# Patient Record
Sex: MALE | Race: BLACK OR AFRICAN AMERICAN | NOT HISPANIC OR LATINO | Employment: OTHER | URBAN - METROPOLITAN AREA
[De-identification: names, ages, dates, MRNs, and addresses within clinical notes are randomized per-mention and may not be internally consistent; named-entity substitution may affect disease eponyms.]

---

## 2020-11-19 ENCOUNTER — TELEPHONE (OUTPATIENT)
Dept: GASTROENTEROLOGY | Facility: CLINIC | Age: 70
End: 2020-11-19

## 2020-11-19 NOTE — TELEPHONE ENCOUNTER
Called patient on number listed and it is the E.R department. Lady that answered did not know anything of patient .

## 2020-11-19 NOTE — TELEPHONE ENCOUNTER
----- Message from Yolanda Hubbard sent at 11/19/2020 11:38 AM CST -----  Contact: Pt  Pt would like a call back at .[phone] (home), he wants an appt in gastro with Dr Navarrete.    Thanks     Yolanda Hubbard

## 2020-11-20 ENCOUNTER — TELEPHONE (OUTPATIENT)
Dept: ENDOSCOPY | Facility: HOSPITAL | Age: 70
End: 2020-11-20

## 2020-11-20 NOTE — TELEPHONE ENCOUNTER
Spoke with Romina regarding a colonoscopy date/time.  Informed her of no order and no referral being noted in the system at this time.  States she would refax the information.

## 2020-11-20 NOTE — TELEPHONE ENCOUNTER
----- Message from Bree Joe sent at 11/20/2020  9:55 AM CST -----  Regarding: [facility] for status of colonoscopy appt ... call back: [phone]

## 2020-12-03 ENCOUNTER — OFFICE VISIT (OUTPATIENT)
Dept: GASTROENTEROLOGY | Facility: CLINIC | Age: 70
End: 2020-12-03
Payer: OTHER GOVERNMENT

## 2020-12-03 VITALS
HEART RATE: 76 BPM | OXYGEN SATURATION: 97 % | BODY MASS INDEX: 28.01 KG/M2 | HEIGHT: 75 IN | DIASTOLIC BLOOD PRESSURE: 72 MMHG | WEIGHT: 225.31 LBS | SYSTOLIC BLOOD PRESSURE: 104 MMHG

## 2020-12-03 DIAGNOSIS — Z12.11 COLON CANCER SCREENING: Primary | ICD-10-CM

## 2020-12-03 PROCEDURE — 99999 PR PBB SHADOW E&M-EST. PATIENT-LVL IV: ICD-10-PCS | Mod: PBBFAC,,, | Performed by: PHYSICIAN ASSISTANT

## 2020-12-03 PROCEDURE — 99499 NO LOS: ICD-10-PCS | Mod: S$PBB,,, | Performed by: PHYSICIAN ASSISTANT

## 2020-12-03 PROCEDURE — 99999 PR PBB SHADOW E&M-EST. PATIENT-LVL IV: CPT | Mod: PBBFAC,,, | Performed by: PHYSICIAN ASSISTANT

## 2020-12-03 PROCEDURE — 99499 UNLISTED E&M SERVICE: CPT | Mod: S$PBB,,, | Performed by: PHYSICIAN ASSISTANT

## 2020-12-03 PROCEDURE — 99214 OFFICE O/P EST MOD 30 MIN: CPT | Mod: PBBFAC | Performed by: PHYSICIAN ASSISTANT

## 2020-12-03 RX ORDER — BUPROPION HYDROCHLORIDE 100 MG/1
100 TABLET ORAL
COMMUNITY

## 2020-12-03 RX ORDER — NAPROXEN SODIUM 220 MG
220 TABLET ORAL
COMMUNITY

## 2020-12-03 RX ORDER — ACETAMINOPHEN 325 MG/1
325 TABLET ORAL
COMMUNITY

## 2020-12-03 RX ORDER — POLYETHYLENE GLYCOL 3350, SODIUM CHLORIDE, SODIUM BICARBONATE, POTASSIUM CHLORIDE 420; 11.2; 5.72; 1.48 G/4L; G/4L; G/4L; G/4L
1 POWDER, FOR SOLUTION ORAL ONCE
Qty: 4000 ML | Refills: 0 | Status: SHIPPED | OUTPATIENT
Start: 2020-12-03 | End: 2020-12-03

## 2020-12-03 RX ORDER — FERROUS GLUCONATE 324(38)MG
324 TABLET ORAL
COMMUNITY

## 2020-12-03 RX ORDER — TIOTROPIUM BROMIDE 18 UG/1
18 CAPSULE ORAL; RESPIRATORY (INHALATION)
COMMUNITY

## 2020-12-03 RX ORDER — BUDESONIDE AND FORMOTEROL FUMARATE DIHYDRATE 160; 4.5 UG/1; UG/1
2 AEROSOL RESPIRATORY (INHALATION)
COMMUNITY

## 2020-12-03 RX ORDER — PROPRANOLOL HYDROCHLORIDE 10 MG/1
10 TABLET ORAL
COMMUNITY

## 2020-12-03 NOTE — PROGRESS NOTES
"  1. Have you been admitted overnight to the hospital in the past 3 months? no   If yes, schedule an appointment with PCP before scheduling endoscopic procedure.     2. Have you had a stent placed in the last 12 months? no   If yes, for a screening visit, cancel and message the ordering provider.  The patient will need a new order when the time is appropriate.     3. Have you had a stroke or heart attack in the past 6 months? no   If yes, cancel and refer patient to ordering provider for clearance, also message ordering provider to inform.     4. Have you had any chest pain in the past 3 months? no   If yes, Have you been evaluated by your PCP and/or cardiologist and it was determined to not be heart related? not applicable   If No, Pt needs to be seen by PCP or Cardiologist .  Pt can be scheduled once clearance obtained by either of those providers.     5. Do you take prescription weight loss medications?  no   If yes, must stop for 2 weeks prior to procedure.     6. Have you been diagnosed with diverticulitis within the past 3 months? no   If yes, must have been seen by GI within the last 3 months, if not schedule with GI FERNANDO.    If pt has been seen by GI, schedule procedure 8-12 weeks post antibiotic treatment.     7. Are you on Dialysis? no  If yes, schedule procedure for the day AFTER dialysis.  Appt time should be 9am or later, patient arrival time is 2 hours prior.  Nulytely or miralax prep for all patients with kidney disease.     8. Are you diabetic?  no   If yes, schedule morning appt. Advise pt to hold all diabetic meds day of procedure.     9. If pt is older than 80 years of age and HAS NOT been seen by GI or PCP within the last 6 months, needs appt with GI FERNANDO.   If pt has been seen by the GI provider or PCP within the past 6  months AND meets criteria, schedule procedure AND send message to the endoscopist.     10. Is patient on a "high risk" medication (blood thinner/antiplatelet agent)?  no   If yes, " has cardiac clearance been obtained within the last 60 days? N/A   If no, a new clearance needs to be obtained.       I have reviewed the last colonoscopy for recommendations regarding next procedure bowel prep.  yes  I have reviewed medications and allergies.  yes  I have verified the pharmacy information and appropriate prep sent if needed. yes  Prep instructions have been mailed or sent to portal per patient request. yes    If answers yes to any of the following, schedule at [facility] ONLY. If No, OK for either location.     Is BMI over 45? no  Any complaints of chest pain, new onset or at rest? no  Does pt have an AICD? no  Is there a diagnosis of heart failure? no  Is patient on dialysis? no  Does patient have an insulin pump? no  If procedure for esophageal banding? no

## 2020-12-03 NOTE — LETTER
December 3, 2020      [facility]  [address] [address]           [facility]  [id number] [place] [id number]  Phone: [phone]  Fax: [phone]          Patient: Joao Cleveland Jr.   MR Number: [id number]   YOB: 1950   Date of Visit: 12/3/2020       Dear [facility]:    Thank you for referring Joao Cleveland to me for evaluation. Attached you will find relevant portions of my assessment and plan of care.    If you have questions, please do not hesitate to call me. I look forward to following Joao Cleveland along with you.    Sincerely,    Katelynn Aguirre PA-C    Enclosure  CC:  No Recipients    If you would like to receive this communication electronically, please contact [email or web address] or [phone] to request more information on [facility] Link access.    For providers and/or their staff who would like to refer a patient to [facility], please contact us through our one-stop-shop provider referral line, [facility], at [phone].    If you feel you have received this communication in error or would no longer like to receive these types of communications, please e-mail [email or web address]

## 2020-12-03 NOTE — PROGRESS NOTES
"Clinic Consult:  [facility] Consultation Note    Reason for Consult:  The encounter diagnosis was Colon cancer screening.    PCP: [facility])   [address] [facility] [place] [address]    CC: Colonoscopy      HPI:  This is a 70 y.o. male with history of cirrhosis here for evaluation of the above. [facility]. Patient states [place] [facility] [place] Believes he had polyps. Also mentions he had a colostomy bag approximately 10 years ago, but can't recall the reason for his surgery. States that he knows "they removed something". [facility] chart shows COPD and asthma on medical history, although patient states he does not have COPD, only asthma. Takes Spiriva daily, prescribed by [facility]. Does not see a pulmonologist. No problem list in [facility] record to confirm or deny this. Occasional shortness of breath if he walks to far. O2 at 97% today in clinic.  Denies abdominal pain, diarrhea, blood in the stool, black stools, nausea, vomiting. Confirms constipation. Takes magnesium citrate as needed for this. No additional complaints. No blood thinners.       Review of Systems   Constitutional: Negative for activity change, appetite change, chills, diaphoresis, fatigue, fever and unexpected weight change.   HENT: Negative for trouble swallowing.    Respiratory: Positive for shortness of breath (Occasional). Negative for cough.    Cardiovascular: Negative for chest pain.   Gastrointestinal: Positive for constipation. Negative for abdominal distention, abdominal pain, anal bleeding, blood in stool, diarrhea, nausea and vomiting.   Neurological: Negative for dizziness, weakness and light-headedness.   Psychiatric/Behavioral: Negative for dysphoric mood. The patient is not nervous/anxious.         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         Family History:    Family History   Problem Relation Age of Onset    Asthma Father     Diabetes Neg Hx        Social History:   Social History     Socioeconomic History    Marital status: Single     Spouse name: Not on file    Number of children: Not on file    Years of education: Not on file    Highest education level: Not on file   Occupational History    Not on file   Social Needs    Financial resource strain: Not on file    Food insecurity     Worry: Not on file     Inability: Not on file    Transportation needs     Medical: Not on file     Non-medical: Not on file   Tobacco Use    Smoking status: Never Smoker    Smokeless tobacco: Never Used   Substance and Sexual Activity    Alcohol use: Not Currently    Drug use: Yes     Types: Cocaine    Sexual activity: Not Currently   Lifestyle    Physical activity     Days per week: Not on file     Minutes per session: Not on file    Stress: Not on file   Relationships    Social connections     Talks on phone: Not on file     Gets together: Not on file     Attends Congregation service: Not on file     Active member of club or organization: Not on file     Attends meetings of clubs or organizations: Not on file     Relationship status: Not on file   Other Topics Concern    Not on file   Social History Narrative    Not on file       Allergies:   Review of patient's allergies indicates:   Allergen Reactions    Amoxicillin        Home Medications:   Current Outpatient Medications on File Prior to Visit   Medication Sig Dispense Refill    acetaminophen (TYLENOL) 325 MG tablet Take 325 mg by mouth as needed for Pain.      budesonide-formoterol 160-4.5 mcg (SYMBICORT) 160-4.5 mcg/actuation HFAA Inhale 2 puffs into the lungs.      buPROPion (WELLBUTRIN) 100 MG tablet Take 100 mg by  "mouth.      ferrous gluconate (FERGON) 324 MG tablet Take 324 mg by mouth.      naproxen sodium (ANAPROX) 220 MG tablet Take 220 mg by mouth as needed.      propranoloL (INDERAL) 10 MG tablet Take 10 mg by mouth.      tiotropium (SPIRIVA) 18 mcg inhalation capsule Inhale 18 mcg into the lungs.      nystatin (MYCOSTATIN) powder Apply topically 4 (four) times daily. (Patient not taking: Reported on 12/3/2020) 30 g 0     No current facility-administered medications on file prior to visit.        Physical Exam:  /72   Pulse 76   Ht 6' 3" (1.905 m)   Wt 102.2 kg (225 lb 5 oz)   SpO2 97%   BMI 28.16 kg/m²   Body mass index is 28.16 kg/m².  Physical Exam  Constitutional:       General: He is not in acute distress.     Appearance: Normal appearance. He is not ill-appearing, toxic-appearing or diaphoretic.      Comments: Ambulating with assistance of cane.   HENT:      Head: Normocephalic and atraumatic.   Eyes:      General: No scleral icterus.     Extraocular Movements: Extraocular movements intact.   Neck:      Musculoskeletal: Normal range of motion.   Cardiovascular:      Rate and Rhythm: Normal rate and regular rhythm.   Pulmonary:      Effort: Pulmonary effort is normal. No respiratory distress.      Breath sounds: Normal breath sounds. No stridor. No wheezing, rhonchi or rales.      Comments: Breath sounds distant  Abdominal:      General: Bowel sounds are normal. There is no distension.      Palpations: Abdomen is soft. There is no mass.      Tenderness: There is no abdominal tenderness. There is no guarding or rebound.      Comments: Vertical surgical scar palpated over center of abdomen   Musculoskeletal: Normal range of motion.   Skin:     General: Skin is warm and dry.      Coloration: Skin is not jaundiced or pale.      Findings: No erythema.   Neurological:      General: No focal deficit present.      Mental Status: He is alert and oriented to person, place, and time.   Psychiatric:         " Mood and Affect: Mood normal.         Behavior: Behavior normal.           Labs: Pertinent labs reviewed.  Endoscopy: 2019 for esophageal variceal screening  CRC Screening: 3 yrs ago  Anticoagulation: none    Assessment:  1. Colon cancer screening         Recommendations:  -Schedule for colonoscopy for screening purposes.  -Possible COPD - patient appears well controlled at this time and O2 sat at 97%. Takes daily Spiriva. Would recommend referral to pulmonology for evaluation.  -Nulytely and COVID screen  -Will obtain records [place] [facility] [place] as patient states he has had previous colostomy along with findings of surgical scar to the abdomen. Patient unsure of what surgical procedure he had.     Colon cancer screening  -     Case Request Endoscopy: COLONOSCOPY  -     COVID-19 Routine Screening; Future; Expected date: 12/03/2020  -     peg-electrolyte soln (NULYTELY WITH FLAVOR PACKS) 420 gram SolR; Take 4,000 mLs by mouth once. for 1 dose  Dispense: 4000 mL; Refill: 0        No follow-ups on file.    Thank you so much for allowing me to participate in the care of Joao Aguirre PA-C

## 2020-12-30 ENCOUNTER — TELEPHONE (OUTPATIENT)
Dept: ENDOSCOPY | Facility: HOSPITAL | Age: 70
End: 2020-12-30

## 2021-05-28 ENCOUNTER — DOCUMENTATION ONLY (OUTPATIENT)
Dept: GASTROENTEROLOGY | Facility: HOSPITAL | Age: 71
End: 2021-05-28